# Patient Record
Sex: FEMALE | Employment: OTHER | URBAN - METROPOLITAN AREA
[De-identification: names, ages, dates, MRNs, and addresses within clinical notes are randomized per-mention and may not be internally consistent; named-entity substitution may affect disease eponyms.]

---

## 2017-07-07 PROBLEM — F32.1 MODERATE SINGLE CURRENT EPISODE OF MAJOR DEPRESSIVE DISORDER (HCC): Status: ACTIVE | Noted: 2017-07-07

## 2017-07-07 PROCEDURE — 87086 URINE CULTURE/COLONY COUNT: CPT | Performed by: INTERNAL MEDICINE

## 2017-07-07 PROCEDURE — 81001 URINALYSIS AUTO W/SCOPE: CPT | Performed by: INTERNAL MEDICINE

## 2021-11-05 ENCOUNTER — OFFICE VISIT (OUTPATIENT)
Dept: OTOLARYNGOLOGY | Facility: CLINIC | Age: 86
End: 2021-11-05
Payer: MEDICARE

## 2021-11-05 DIAGNOSIS — H61.23 CERUMEN DEBRIS ON TYMPANIC MEMBRANE OF BOTH EARS: Primary | ICD-10-CM

## 2021-11-05 PROCEDURE — G0268 REMOVAL OF IMPACTED WAX MD: HCPCS | Performed by: SPECIALIST

## 2021-11-05 NOTE — PROGRESS NOTES
Ears = bilateral cerumen occlussions. Fully cleaned under microscope using instrumentation and suctioning. Normal tympanic membranes. Follow-up in 6 months time, sooner if problems.

## 2022-05-06 ENCOUNTER — OFFICE VISIT (OUTPATIENT)
Dept: OTOLARYNGOLOGY | Facility: CLINIC | Age: 87
End: 2022-05-06
Payer: MEDICARE

## 2022-05-06 DIAGNOSIS — H61.23 CERUMEN DEBRIS ON TYMPANIC MEMBRANE OF BOTH EARS: Primary | ICD-10-CM

## 2022-05-06 PROCEDURE — 69210 REMOVE IMPACTED EAR WAX UNI: CPT | Performed by: SPECIALIST
